# Patient Record
Sex: MALE | Race: WHITE | NOT HISPANIC OR LATINO
[De-identification: names, ages, dates, MRNs, and addresses within clinical notes are randomized per-mention and may not be internally consistent; named-entity substitution may affect disease eponyms.]

---

## 2017-04-06 ENCOUNTER — APPOINTMENT (OUTPATIENT)
Dept: OTOLARYNGOLOGY | Facility: CLINIC | Age: 22
End: 2017-04-06

## 2017-04-06 VITALS
WEIGHT: 186 LBS | HEIGHT: 72 IN | DIASTOLIC BLOOD PRESSURE: 61 MMHG | BODY MASS INDEX: 25.19 KG/M2 | HEART RATE: 75 BPM | SYSTOLIC BLOOD PRESSURE: 110 MMHG

## 2018-04-16 ENCOUNTER — APPOINTMENT (OUTPATIENT)
Dept: OTOLARYNGOLOGY | Facility: CLINIC | Age: 23
End: 2018-04-16
Payer: COMMERCIAL

## 2018-04-16 VITALS
BODY MASS INDEX: 25.33 KG/M2 | HEIGHT: 72 IN | HEART RATE: 55 BPM | DIASTOLIC BLOOD PRESSURE: 64 MMHG | WEIGHT: 187 LBS | SYSTOLIC BLOOD PRESSURE: 106 MMHG

## 2018-04-16 PROCEDURE — 99214 OFFICE O/P EST MOD 30 MIN: CPT | Mod: 25

## 2018-04-16 PROCEDURE — 69210 REMOVE IMPACTED EAR WAX UNI: CPT

## 2018-05-21 ENCOUNTER — OUTPATIENT (OUTPATIENT)
Dept: OUTPATIENT SERVICES | Facility: HOSPITAL | Age: 23
LOS: 1 days | End: 2018-05-21

## 2018-07-06 DIAGNOSIS — Z01.20 ENCOUNTER FOR DENTAL EXAMINATION AND CLEANING WITHOUT ABNORMAL FINDINGS: ICD-10-CM

## 2018-07-17 ENCOUNTER — OUTPATIENT (OUTPATIENT)
Dept: OUTPATIENT SERVICES | Facility: HOSPITAL | Age: 23
LOS: 1 days | End: 2018-07-17

## 2018-07-17 VITALS
HEIGHT: 70 IN | DIASTOLIC BLOOD PRESSURE: 70 MMHG | RESPIRATION RATE: 16 BRPM | HEART RATE: 70 BPM | SYSTOLIC BLOOD PRESSURE: 110 MMHG | TEMPERATURE: 98 F | WEIGHT: 173.94 LBS

## 2018-07-17 DIAGNOSIS — K01.1 IMPACTED TEETH: ICD-10-CM

## 2018-07-17 DIAGNOSIS — Z98.890 OTHER SPECIFIED POSTPROCEDURAL STATES: Chronic | ICD-10-CM

## 2018-07-17 LAB
HCT VFR BLD CALC: 45.3 % — SIGNIFICANT CHANGE UP (ref 39–50)
HGB BLD-MCNC: 15.1 G/DL — SIGNIFICANT CHANGE UP (ref 13–17)
MCHC RBC-ENTMCNC: 29.7 PG — SIGNIFICANT CHANGE UP (ref 27–34)
MCHC RBC-ENTMCNC: 33.3 % — SIGNIFICANT CHANGE UP (ref 32–36)
MCV RBC AUTO: 89 FL — SIGNIFICANT CHANGE UP (ref 80–100)
NRBC # FLD: 0 — SIGNIFICANT CHANGE UP
PLATELET # BLD AUTO: 238 K/UL — SIGNIFICANT CHANGE UP (ref 150–400)
PMV BLD: 10.5 FL — SIGNIFICANT CHANGE UP (ref 7–13)
RBC # BLD: 5.09 M/UL — SIGNIFICANT CHANGE UP (ref 4.2–5.8)
RBC # FLD: 11.8 % — SIGNIFICANT CHANGE UP (ref 10.3–14.5)
WBC # BLD: 5.93 K/UL — SIGNIFICANT CHANGE UP (ref 3.8–10.5)
WBC # FLD AUTO: 5.93 K/UL — SIGNIFICANT CHANGE UP (ref 3.8–10.5)

## 2018-07-17 RX ORDER — SODIUM CHLORIDE 9 MG/ML
3 INJECTION INTRAMUSCULAR; INTRAVENOUS; SUBCUTANEOUS EVERY 8 HOURS
Qty: 0 | Refills: 0 | Status: DISCONTINUED | OUTPATIENT
Start: 2018-07-26 | End: 2018-08-10

## 2018-07-17 RX ORDER — SODIUM CHLORIDE 9 MG/ML
1000 INJECTION, SOLUTION INTRAVENOUS
Qty: 0 | Refills: 0 | Status: DISCONTINUED | OUTPATIENT
Start: 2018-07-26 | End: 2018-08-10

## 2018-07-17 NOTE — H&P PST ADULT - NSANTHOSAYNRD_GEN_A_CORE
No. MESERET screening performed.  STOP BANG Legend: 0-2 = LOW Risk; 3-4 = INTERMEDIATE Risk; 5-8 = HIGH Risk

## 2018-07-17 NOTE — H&P PST ADULT - RS GEN PE MLT RESP DETAILS PC
no subcutaneous emphysema/no intercostal retractions/respirations non-labored/breath sounds equal/clear to auscultation bilaterally/no rales/good air movement/airway patent/no rhonchi/no wheezes/no chest wall tenderness

## 2018-07-17 NOTE — H&P PST ADULT - PROBLEM SELECTOR PLAN 1
Scheduled for extractions 1, 16, 17 & 32, bone graft #19 on 07/26/18. Pre op instructions, famotidine given and explained. Pt verbalized understanding.

## 2018-07-17 NOTE — H&P PST ADULT - NEGATIVE PSYCHIATRIC SYMPTOMS
no suicidal ideation no paranoia/no auditory hallucinations/no insomnia/no mood swings/no memory loss/no suicidal ideation/no agitation/no visual hallucinations/no hyperactivity

## 2018-07-17 NOTE — H&P PST ADULT - NEGATIVE ENMT SYMPTOMS
no tinnitus/no vertigo/no nose bleeds/no throat pain/no dysphagia/no gum bleeding/no recurrent cold sores/no sinus symptoms/no nasal congestion/no nasal discharge/no post-nasal discharge/no ear pain/no nasal obstruction/no hearing difficulty/no abnormal taste sensation

## 2018-07-17 NOTE — H&P PST ADULT - HISTORY OF PRESENT ILLNESS
22 y/o  male with PMH: Autism, Anxiety disorder presents to [facility] for pre op evaluation in preparation for Extractions 1, 16,17 & 32, bone graft #19 on 07/26/18

## 2018-07-25 ENCOUNTER — TRANSCRIPTION ENCOUNTER (OUTPATIENT)
Age: 23
End: 2018-07-25

## 2018-07-25 NOTE — ASU PATIENT PROFILE, ADULT - TEACHING/LEARNING LEARNING PREFERENCES
skill demonstration/audio/individual instruction/pictorial/video/verbal instruction/written material

## 2018-07-26 ENCOUNTER — OUTPATIENT (OUTPATIENT)
Dept: OUTPATIENT SERVICES | Facility: HOSPITAL | Age: 23
LOS: 1 days | Discharge: HOME | End: 2018-07-26

## 2018-07-26 VITALS
TEMPERATURE: 98 F | RESPIRATION RATE: 20 BRPM | DIASTOLIC BLOOD PRESSURE: 80 MMHG | OXYGEN SATURATION: 97 % | SYSTOLIC BLOOD PRESSURE: 146 MMHG | HEART RATE: 95 BPM

## 2018-07-26 VITALS
DIASTOLIC BLOOD PRESSURE: 68 MMHG | HEART RATE: 84 BPM | SYSTOLIC BLOOD PRESSURE: 117 MMHG | HEIGHT: 70 IN | TEMPERATURE: 98 F | OXYGEN SATURATION: 96 % | WEIGHT: 173.94 LBS | RESPIRATION RATE: 16 BRPM

## 2018-07-26 DIAGNOSIS — K01.1 IMPACTED TEETH: ICD-10-CM

## 2018-07-26 DIAGNOSIS — Z98.890 OTHER SPECIFIED POSTPROCEDURAL STATES: Chronic | ICD-10-CM

## 2018-07-26 RX ORDER — CHLORHEXIDINE GLUCONATE 213 G/1000ML
15 SOLUTION TOPICAL
Qty: 471 | Refills: 0 | OUTPATIENT
Start: 2018-07-26 | End: 2018-08-01

## 2018-07-26 RX ORDER — RISPERIDONE 4 MG/1
1 TABLET ORAL
Qty: 0 | Refills: 0 | COMMUNITY

## 2018-07-26 RX ORDER — IBUPROFEN 200 MG
1 TABLET ORAL
Qty: 28 | Refills: 0 | OUTPATIENT
Start: 2018-07-26 | End: 2018-08-01

## 2018-07-26 RX ORDER — ESCITALOPRAM OXALATE 10 MG/1
15 TABLET, FILM COATED ORAL
Qty: 0 | Refills: 0 | COMMUNITY

## 2018-07-26 RX ORDER — SODIUM CHLORIDE 9 MG/ML
1000 INJECTION, SOLUTION INTRAVENOUS
Qty: 0 | Refills: 0 | Status: DISCONTINUED | OUTPATIENT
Start: 2018-07-26 | End: 2018-08-10

## 2018-07-26 RX ORDER — OXYCODONE HYDROCHLORIDE 5 MG/1
5 TABLET ORAL EVERY 6 HOURS
Qty: 0 | Refills: 0 | Status: DISCONTINUED | OUTPATIENT
Start: 2018-07-26 | End: 2018-07-26

## 2018-07-26 RX ORDER — ACETAMINOPHEN 500 MG
1000 TABLET ORAL ONCE
Qty: 0 | Refills: 0 | Status: DISCONTINUED | OUTPATIENT
Start: 2018-07-26 | End: 2018-08-10

## 2018-07-26 RX ORDER — CHLORHEXIDINE GLUCONATE 213 G/1000ML
15 SOLUTION TOPICAL
Qty: 0 | Refills: 0 | Status: DISCONTINUED | OUTPATIENT
Start: 2018-07-26 | End: 2018-08-10

## 2018-07-26 RX ORDER — FENTANYL CITRATE 50 UG/ML
50 INJECTION INTRAVENOUS
Qty: 0 | Refills: 0 | Status: DISCONTINUED | OUTPATIENT
Start: 2018-07-26 | End: 2018-07-26

## 2018-07-26 RX ORDER — ONDANSETRON 8 MG/1
4 TABLET, FILM COATED ORAL ONCE
Qty: 0 | Refills: 0 | Status: DISCONTINUED | OUTPATIENT
Start: 2018-07-26 | End: 2018-07-26

## 2018-07-26 RX ORDER — IBUPROFEN 200 MG
600 TABLET ORAL EVERY 6 HOURS
Qty: 0 | Refills: 0 | Status: DISCONTINUED | OUTPATIENT
Start: 2018-07-26 | End: 2018-08-10

## 2018-07-26 NOTE — H&P ADULT - ASSESSMENT
22 y/o male with autism requiring extraction of #1,16,17,19,32 and bone graft of #19 for future implant placement with Dr. Alexandre in operating room. Patient does not tolerate procedure under local anesthesia.  - Consent obtained and in chart  - Patient most likely discharged post-surgery

## 2018-07-26 NOTE — H&P ADULT - REASON FOR ADMISSION
22 y/o male with autism requiring extraction of #1,16,17,19,32 and bone graft of #19 for future implant placement with Dr. Alexandre in operating room.

## 2018-07-26 NOTE — ASU DISCHARGE PLAN (ADULT/PEDIATRIC). - MEDICATION SUMMARY - MEDICATIONS TO TAKE
I will START or STAY ON the medications listed below when I get home from the hospital:    Norco 5 mg-325 mg oral tablet  -- 1 tab(s) by mouth every 8 hours MDD:MDD: 8 tabs - as needed  -- Caution federal law prohibits the transfer of this drug to any person other  than the person for whom it was prescribed.  May cause drowsiness.  Alcohol may intensify this effect.  Use care when operating dangerous machinery.  This product contains acetaminophen.  Do not use  with any other product containing acetaminophen to prevent possible liver damage.  Using more of this medication than prescribed may cause serious breathing problems.    -- Indication: For pain    ibuprofen 600 mg oral tablet  -- 1 tab(s) by mouth every 6 hours. PRN - as needed  -- Do not take this drug if you are pregnant.  It is very important that you take or use this exactly as directed.  Do not skip doses or discontinue unless directed by your doctor.  May cause drowsiness or dizziness.  Obtain medical advice before taking any non-prescription drugs as some may affect the action of this medication.  Take with food or milk.    -- Indication: For pain    clindamycin 300 mg oral capsule  -- 1 cap(s) by mouth every 6 hours   -- Finish all this medication unless otherwise directed by prescriber.  Medication should be taken with plenty of water.    -- Indication: For Infection control I will START or STAY ON the medications listed below when I get home from the hospital:    ibuprofen 600 mg oral tablet  -- 1 tab(s) by mouth every 6 hours. PRN - as needed  -- Do not take this drug if you are pregnant.  It is very important that you take or use this exactly as directed.  Do not skip doses or discontinue unless directed by your doctor.  May cause drowsiness or dizziness.  Obtain medical advice before taking any non-prescription drugs as some may affect the action of this medication.  Take with food or milk.    -- Indication: For pain    Norco 5 mg-325 mg oral tablet  -- 1 tab(s) by mouth every 8 hours MDD:MDD: 3 tabs  -- Caution federal law prohibits the transfer of this drug to any person other  than the person for whom it was prescribed.  May cause drowsiness.  Alcohol may intensify this effect.  Use care when operating dangerous machinery.  This product contains acetaminophen.  Do not use  with any other product containing acetaminophen to prevent possible liver damage.  Using more of this medication than prescribed may cause serious breathing problems.    -- Indication: For pain    Peridex 0.12% mucous membrane liquid  -- 15 milliliter(s) by mouth 2 times a day x 7 days   -- Indication: For Infection prophylaxis    clindamycin 300 mg oral capsule  -- 1 cap(s) by mouth every 6 hours   -- Finish all this medication unless otherwise directed by prescriber.  Medication should be taken with plenty of water.    -- Indication: For Infection control

## 2018-07-26 NOTE — H&P ADULT - NSHPPHYSICALEXAM_GEN_ALL_CORE
Gen: NAD, AAOx3  CV: RRR, no murmurs  Pulm: CTA b/l, no wheezes  H: NCAT, normocephalic, no edema  E: PERRL, EOMI, no proptosis, no obvious signs of corneal abrasion, no diplopia, no changes in vision  E: TMs clear b/l, no audio changes  N: nares clear b/l, no septal hematoma  T: oropharyngeal clear, trachea midline  EOE: no LAD, no extraoral edema, no signs of lacerations  TMJ: FROM, no clicking/popping b/l  KT: 42-44mm  IOE: generalized good OH, no dental trauma, occlusion stable and reproducible, gingiva clean, no signs of lacerations intraorally

## 2018-07-26 NOTE — ASU DISCHARGE PLAN (ADULT/PEDIATRIC). - INSTRUCTIONS
1. Soft non-chew diet for first 3 days.   2. Gradually transition with small pieces after three days until comfortable chewing.

## 2018-07-26 NOTE — ASU DISCHARGE PLAN (ADULT/PEDIATRIC). - ITEMS TO FOLLOWUP WITH YOUR PHYSICIAN'S
FOLLOW-UP: Please follow up with your primary care physician in one week regarding your hospitalization. Please follow-up with your surgeon, within 7 days following discharge- please call to schedule an appointment.

## 2018-07-26 NOTE — ASU DISCHARGE PLAN (ADULT/PEDIATRIC). - SPECIAL INSTRUCTIONS
WOUND CARE:  Please keep incisions clean and dry. Please do not Scrub or rub incisions. Do not use lotion or powder on incisions.   BATHING: Please do not submerge wound underwater. You may shower and/or sponge bathe.  ACTIVITY: No heavy lifting or straining. Otherwise, you may return to your usual level of physical activity. If you are taking narcotic pain medication (such as Percocet) DO NOT drive a car, operate machinery or make important decisions.  NOTIFY YOUR SURGEON IF: You have any bleeding that does not stop, any pus draining from your wound(s), any fever (over 100.4 F) or chills, persistent nausea/vomiting, persistent diarrhea, or if your pain is not controlled on your discharge pain medications.

## 2018-07-26 NOTE — H&P ADULT - NSHPREVIEWOFSYSTEMS_GEN_ALL_CORE
Review of Systems:   · Negative General Symptoms	no fever; no chills; no anorexia; no weight loss; no weight gain; no malaise; no fatigue  · Skin/Breast	negative  · Ophthalmologic	negative   	  · Negative ENMT Symptoms	no hearing difficulty; no ear pain; no tinnitus; no vertigo; no sinus symptoms; no nasal congestion; no nasal discharge; no nasal obstruction; no post-nasal discharge; no nose bleeds; no recurrent cold sores; no abnormal taste sensation; no gum bleeding; no throat pain; no dysphagia  · ENMT Symptoms	impacted teeth   · Negative Respiratory and Thorax Symptoms	no wheezing; no dyspnea; no cough; no hemoptysis; no pleuritic chest pain  · Negative Cardiovascular Symptoms	no chest pain; no palpitations; no orthopnea; no claudication  · Gastrointestinal	negative  · Genitourinary	negative  · Musculoskeletal	negative  · Neurological	negative  · Negative Psychiatric Symptoms	no suicidal ideation; no insomnia; no memory loss; no paranoia; no mood swings; no agitation; no visual hallucinations; no auditory hallucinations; no hyperactivity  · Psychiatric Symptoms	anxiety  · Hematology/Lymphatics	negative  · Endocrine	negative  · Allergy Types	reactions to medicines; reactions to food  · Negative Immunological Symptoms	no recurring infections; no persistent infections; no recurring pneumonia

## 2018-07-26 NOTE — ASU DISCHARGE PLAN (ADULT/PEDIATRIC). - NOTIFY
Swelling that continues/Increased Irritability or Sluggishness/Excessive Diarrhea/Pain not relieved by Medications/Persistent Nausea and Vomiting/Fever greater than 101/Numbness, tingling/Inability to Tolerate Liquids or Foods/Bleeding that does not stop/Numbness, color, or temperature change to extremity/Unable to Urinate Swelling that continues/Inability to Tolerate Liquids or Foods/Increased Irritability or Sluggishness/Fever greater than 101/Excessive Diarrhea/Pain not relieved by Medications/Bleeding that does not stop/Persistent Nausea and Vomiting/Unable to Urinate

## 2018-07-26 NOTE — ASU DISCHARGE PLAN (ADULT/PEDIATRIC). - ACTIVITY LEVEL
no sports/gym/no tampons/no intercourse/no heavy lifting/no weight bearing/no tub baths/no exercise/no douching no heavy lifting/no exercise/no sports/gym

## 2018-07-26 NOTE — H&P ADULT - HISTORY OF PRESENT ILLNESS
24 y/o male with autism requiring extraction of #1,16,17,19,32 and bone graft of #19 for future implant placement with Dr. Alexandre in operating room. Patient does not tolerate procedure under local anesthesia. 22 y/o male with autism requiring extraction of #1,16,17,19,32 and bone graft of #19 for future implant placement with Dr. Alexandre in operating room. Patient does not tolerate procedure under local anesthesia. Mother understands that GA will most likely be required for future implant placement in OR

## 2018-07-26 NOTE — ASU DISCHARGE PLAN (ADULT/PEDIATRIC). - PAIN
prescription given by MD
DVT ppx: therapeutically anticoagulated with Eliquis  Diet: Carbohydrate restricted  Dispo: Pending PT evaluation    Elena Ribeiro MD  PGY-1 I Internal Medicine  Pager [phone]

## 2019-04-12 PROBLEM — K01.1 IMPACTED TEETH: Chronic | Status: ACTIVE | Noted: 2018-07-17

## 2019-04-12 PROBLEM — F41.9 ANXIETY DISORDER, UNSPECIFIED: Chronic | Status: ACTIVE | Noted: 2018-07-17

## 2019-04-12 PROBLEM — F84.0 AUTISTIC DISORDER: Chronic | Status: ACTIVE | Noted: 2018-07-17

## 2019-05-07 ENCOUNTER — APPOINTMENT (OUTPATIENT)
Dept: OTOLARYNGOLOGY | Facility: CLINIC | Age: 24
End: 2019-05-07
Payer: COMMERCIAL

## 2019-05-07 VITALS
HEIGHT: 72 IN | DIASTOLIC BLOOD PRESSURE: 65 MMHG | BODY MASS INDEX: 25.33 KG/M2 | WEIGHT: 187 LBS | SYSTOLIC BLOOD PRESSURE: 109 MMHG | HEART RATE: 64 BPM

## 2019-05-07 PROCEDURE — 92579 VISUAL AUDIOMETRY (VRA): CPT

## 2019-05-07 PROCEDURE — G0268 REMOVAL OF IMPACTED WAX MD: CPT

## 2019-05-07 PROCEDURE — 99214 OFFICE O/P EST MOD 30 MIN: CPT | Mod: 25

## 2019-05-07 PROCEDURE — 92567 TYMPANOMETRY: CPT

## 2019-05-07 NOTE — ASSESSMENT
[FreeTextEntry1] : Patient comes in cerumen impaction bilaterally curetted out audiogram performed no change stable will follow up and see us as needed.

## 2019-05-07 NOTE — HISTORY OF PRESENT ILLNESS
[de-identified] : PAtient is here today with minor ear clogging bilatearlly and needs an ear cleaning. He does not have any pain in the ears ringing in the ears. He does not havea ny recent history of ear infections. He has not had any nasal congsetion or runny nose

## 2020-06-15 ENCOUNTER — APPOINTMENT (OUTPATIENT)
Dept: OTOLARYNGOLOGY | Facility: CLINIC | Age: 25
End: 2020-06-15

## 2020-11-10 ENCOUNTER — APPOINTMENT (OUTPATIENT)
Dept: OTOLARYNGOLOGY | Facility: CLINIC | Age: 25
End: 2020-11-10
Payer: COMMERCIAL

## 2020-11-10 VITALS
SYSTOLIC BLOOD PRESSURE: 106 MMHG | DIASTOLIC BLOOD PRESSURE: 63 MMHG | WEIGHT: 187 LBS | TEMPERATURE: 97.9 F | BODY MASS INDEX: 25.33 KG/M2 | HEIGHT: 72 IN | HEART RATE: 81 BPM

## 2020-11-10 PROCEDURE — 69210 REMOVE IMPACTED EAR WAX UNI: CPT

## 2020-11-10 PROCEDURE — 99214 OFFICE O/P EST MOD 30 MIN: CPT | Mod: 25

## 2020-11-10 PROCEDURE — 99072 ADDL SUPL MATRL&STAF TM PHE: CPT

## 2020-11-10 NOTE — ASSESSMENT
[FreeTextEntry1] : Patient also for his annual had a hearing test last year massive cerumen impaction bilaterally allowed us to remove it with a vacuum he did great he will follow-up and see us in 1 year.

## 2020-11-10 NOTE — HISTORY OF PRESENT ILLNESS
[de-identified] : Patient is having minor ear clogging bilaterally. he does not have any pain in the ears and is not complaining or putting his fingers in his ears. He does not have any changes in hearing from what his aid can tell. He has not had any recent ear infections. He does not have any nasal congestion or runny nose

## 2021-12-05 NOTE — ASU PREOP CHECKLIST - AS BP NONINV SITE
The ear nose throat doctor recommends that he follow-up with you in 1 month and that this should resolve the next several weeks.  My recommendation is do the nasal spray as needed  The nasal decongestant do it for 4 days 1 spray each nose 3 times a day to clear up the back your throat and allow for drainage.  Also use the steroid 1 spray each nose for 14 days.   left upper arm

## 2022-01-13 ENCOUNTER — APPOINTMENT (OUTPATIENT)
Dept: OTOLARYNGOLOGY | Facility: CLINIC | Age: 27
End: 2022-01-13
Payer: COMMERCIAL

## 2022-01-13 VITALS
HEIGHT: 72 IN | TEMPERATURE: 98.4 F | BODY MASS INDEX: 25.73 KG/M2 | DIASTOLIC BLOOD PRESSURE: 72 MMHG | HEART RATE: 78 BPM | SYSTOLIC BLOOD PRESSURE: 108 MMHG | WEIGHT: 190 LBS

## 2022-01-13 DIAGNOSIS — H90.5 UNSPECIFIED SENSORINEURAL HEARING LOSS: ICD-10-CM

## 2022-01-13 DIAGNOSIS — H69.83 OTHER SPECIFIED DISORDERS OF EUSTACHIAN TUBE, BILATERAL: ICD-10-CM

## 2022-01-13 DIAGNOSIS — F84.0 AUTISTIC DISORDER: ICD-10-CM

## 2022-01-13 PROCEDURE — 99214 OFFICE O/P EST MOD 30 MIN: CPT | Mod: 25

## 2022-01-13 PROCEDURE — 69210 REMOVE IMPACTED EAR WAX UNI: CPT

## 2022-01-13 NOTE — ASSESSMENT
[FreeTextEntry1] : Patient follows up yearly massive cerumen impaction once again patient was incredibly cooperative allowed us to take out the wax bilaterally no further acute interventions indicated he'll follow-up with us annually.

## 2022-01-13 NOTE — HISTORY OF PRESENT ILLNESS
[de-identified] : Patient presents for routine ear cleaning. No change in hearing since last Visit. He is doing well. No ear infections.

## 2022-05-18 ENCOUNTER — NON-APPOINTMENT (OUTPATIENT)
Age: 27
End: 2022-05-18

## 2022-07-26 NOTE — ASU DISCHARGE PLAN (ADULT/PEDIATRIC). - FOR NEXT 12 HOURS DO NOT:
Statement Selected If you are a smoker, it is important for your health to stop smoking. Please be aware that second hand smoke is also harmful.

## 2023-02-28 ENCOUNTER — APPOINTMENT (OUTPATIENT)
Dept: OTOLARYNGOLOGY | Facility: CLINIC | Age: 28
End: 2023-02-28
Payer: COMMERCIAL

## 2023-02-28 VITALS
HEART RATE: 84 BPM | DIASTOLIC BLOOD PRESSURE: 76 MMHG | WEIGHT: 190 LBS | HEIGHT: 72 IN | SYSTOLIC BLOOD PRESSURE: 119 MMHG | BODY MASS INDEX: 25.73 KG/M2

## 2023-02-28 PROCEDURE — 99213 OFFICE O/P EST LOW 20 MIN: CPT | Mod: 25

## 2023-02-28 PROCEDURE — 69210 REMOVE IMPACTED EAR WAX UNI: CPT

## 2023-02-28 RX ORDER — ACETAMINOPHEN/DIPHENHYDRAMINE 500MG-25MG
TABLET ORAL
Refills: 0 | Status: ACTIVE | COMMUNITY

## 2023-03-02 NOTE — HISTORY OF PRESENT ILLNESS
[de-identified] : 27 year old male, initial consultation for annual ear exam \par History of Autism. Currently resident of group home\par Patient presents for annual ear exam. \par No recent ear infections, otalgia or otorrhea \par

## 2023-03-02 NOTE — PHYSICAL EXAM
[] : septum deviated to the left [Normal] : mucosa is normal [Midline] : trachea located in midline position [Hearing Loss Right Only] : normal [Hearing Loss Left Only] : normal [FreeTextEntry8] : cerumen impaction. removed [FreeTextEntry9] : cerumen impaction. removed

## 2023-03-02 NOTE — ASSESSMENT
[FreeTextEntry1] : 27 year M  with bilateral cerumen impaction. Patient tolerated cerumen removal without complaints. \par \par Physical exam shows bilateral  ears normal EAC/TM. Aide/Patient states hearing is baseline after cerumen removal \par \par Recommend:\par Cerumen Impaction\par -Discussed not using q-tips or instruments to remove wax\par -Olive or mineral oil 1x times every 2 week to keep ear canal lubricated. Discussed that the ear is a self cleaning structure and just allow it clean itself. If wax builds up can try debrox. Once it gets impacted recommend return to get it cleaned out.\par -Can consider repeat audiogram next visit is there is concern for any decreased hearing\par \par -Return to clinic annually or sooner if new/worsen symptoms present\par

## 2023-11-07 ENCOUNTER — APPOINTMENT (OUTPATIENT)
Age: 28
End: 2023-11-07
Payer: MEDICAID

## 2023-11-07 ENCOUNTER — APPOINTMENT (OUTPATIENT)
Age: 28
End: 2023-11-07

## 2023-11-07 PROCEDURE — ZZZZZ: CPT

## 2024-04-25 ENCOUNTER — APPOINTMENT (OUTPATIENT)
Dept: OTOLARYNGOLOGY | Facility: CLINIC | Age: 29
End: 2024-04-25
Payer: COMMERCIAL

## 2024-04-25 VITALS — BODY MASS INDEX: 25.73 KG/M2 | HEIGHT: 72 IN | WEIGHT: 190 LBS

## 2024-04-25 DIAGNOSIS — H93.293 OTHER ABNORMAL AUDITORY PERCEPTIONS, BILATERAL: ICD-10-CM

## 2024-04-25 DIAGNOSIS — H61.23 IMPACTED CERUMEN, BILATERAL: ICD-10-CM

## 2024-04-25 PROCEDURE — 69210 REMOVE IMPACTED EAR WAX UNI: CPT

## 2024-04-25 PROCEDURE — 99213 OFFICE O/P EST LOW 20 MIN: CPT | Mod: 25

## 2024-04-25 NOTE — HISTORY OF PRESENT ILLNESS
[de-identified] : 28 year old male with Autism Spectrum Disorder and hx of cerumen impaction. Presents with caretaker from group home for annual ear exam.  No current complaints of otalgia or difficulty hearing. Denies otorrhea and recent ear infections.

## 2024-04-25 NOTE — PHYSICAL EXAM
[] : septum deviated to the left [Midline] : trachea located in midline position [FreeTextEntry8] : cerumen impaction. removed [FreeTextEntry9] : cerumen impaction. removed [Normal] : no abnormal secretions

## 2024-04-25 NOTE — ASSESSMENT
[FreeTextEntry1] : 27 year M  with bilateral cerumen impaction. Patient tolerated cerumen removal without complaints.   Physical exam shows bilateral  ears normal EAC/TM. Aide/Patient states hearing is baseline after cerumen removal   Recommend: Cerumen Impaction -Discussed not using q-tips or instruments to remove wax -Olive or mineral oil 1x times every 2 week to keep ear canal lubricated. Discussed that the ear is a self cleaning structure and just allow it clean itself. If wax builds up can try debrox. Once it gets impacted recommend return to get it cleaned out. -Can consider repeat audiogram next visit is there is concern for any decreased hearing  -Return to clinic annually or sooner if new/worsen symptoms present

## 2025-06-24 ENCOUNTER — APPOINTMENT (OUTPATIENT)
Dept: OTOLARYNGOLOGY | Facility: CLINIC | Age: 30
End: 2025-06-24

## 2025-07-10 ENCOUNTER — APPOINTMENT (OUTPATIENT)
Dept: OTOLARYNGOLOGY | Facility: CLINIC | Age: 30
End: 2025-07-10